# Patient Record
Sex: MALE | Race: WHITE | NOT HISPANIC OR LATINO | Employment: FULL TIME | URBAN - METROPOLITAN AREA
[De-identification: names, ages, dates, MRNs, and addresses within clinical notes are randomized per-mention and may not be internally consistent; named-entity substitution may affect disease eponyms.]

---

## 2022-07-01 ENCOUNTER — APPOINTMENT (OUTPATIENT)
Dept: LAB | Facility: CLINIC | Age: 32
End: 2022-07-01

## 2022-07-01 ENCOUNTER — OCCMED (OUTPATIENT)
Dept: URGENT CARE | Facility: CLINIC | Age: 32
End: 2022-07-01

## 2022-07-01 DIAGNOSIS — Z02.1 PRE-EMPLOYMENT HEALTH SCREENING EXAMINATION: ICD-10-CM

## 2022-07-01 DIAGNOSIS — Z02.1 PRE-EMPLOYMENT HEALTH SCREENING EXAMINATION: Primary | ICD-10-CM

## 2022-07-01 LAB — RUBV IGG SERPL IA-ACNC: 95.4 IU/ML

## 2022-07-01 PROCEDURE — 36415 COLL VENOUS BLD VENIPUNCTURE: CPT

## 2022-07-01 PROCEDURE — 86735 MUMPS ANTIBODY: CPT

## 2022-07-01 PROCEDURE — 86762 RUBELLA ANTIBODY: CPT

## 2022-07-01 PROCEDURE — 86787 VARICELLA-ZOSTER ANTIBODY: CPT

## 2022-07-01 PROCEDURE — 86765 RUBEOLA ANTIBODY: CPT

## 2022-07-01 PROCEDURE — 86480 TB TEST CELL IMMUN MEASURE: CPT

## 2022-07-01 NOTE — PROGRESS NOTES
This encounter was created for [facility] orders only   [address]        NAME: Josh Navarro is a 32 y o  male  : 1990    MRN: [id number]  DATE: 2022  TIME: 5:23 PM    There were no vitals taken for this visit  Assessment and Plan   Pre-employment health screening examination [Z02 1]  1  Pre-employment health screening examination  Quantiferon TB Gold Plus    Quantiferon TB Gold Plus    Varicella zoster antibody, IgG    Mumps antibody, IgG    Rubella antibody, IgG    Rubeola antibody IgG         Patient Instructions       Follow up with PCP in 3-5 days  Proceed to  ER if symptoms worsen  Chief Complaint   No chief complaint on file  History of Present Illness       HPI    Review of Systems   Review of Systems      Current Medications     No current outpatient medications on file  Current Allergies     Allergies as of 2022    (Not on File)            The following portions of the patient's history were reviewed and updated as appropriate: allergies, current medications, past family history, past medical history, past social history, past surgical history and problem list      No past medical history on file  No past surgical history on file  No family history on file  Medications have been verified  Objective   There were no vitals taken for this visit         Physical Exam     Physical Exam

## 2022-07-02 LAB
MEV IGG SER QL IA: NORMAL
MUV IGG SER QL IA: NORMAL
VZV IGG SER QL IA: ABNORMAL

## 2022-07-04 LAB
GAMMA INTERFERON BACKGROUND BLD IA-ACNC: 0.05 IU/ML
M TB IFN-G BLD-IMP: NEGATIVE
M TB IFN-G CD4+ BCKGRND COR BLD-ACNC: -0.03 IU/ML
M TB IFN-G CD4+ BCKGRND COR BLD-ACNC: -0.03 IU/ML
MITOGEN IGNF BCKGRD COR BLD-ACNC: >10 IU/ML

## 2025-03-27 SDOH — ECONOMIC STABILITY: FOOD INSECURITY: WITHIN THE PAST 12 MONTHS, THE FOOD YOU BOUGHT JUST DIDN'T LAST AND YOU DIDN'T HAVE MONEY TO GET MORE.: NEVER TRUE

## 2025-03-27 SDOH — ECONOMIC STABILITY: INCOME INSECURITY: IN THE LAST 12 MONTHS, WAS THERE A TIME WHEN YOU WERE NOT ABLE TO PAY THE MORTGAGE OR RENT ON TIME?: NO

## 2025-03-27 SDOH — ECONOMIC STABILITY: TRANSPORTATION INSECURITY
IN THE PAST 12 MONTHS, HAS LACK OF TRANSPORTATION KEPT YOU FROM MEETINGS, WORK, OR FROM GETTING THINGS NEEDED FOR DAILY LIVING?: NO

## 2025-03-27 SDOH — ECONOMIC STABILITY: FOOD INSECURITY: WITHIN THE PAST 12 MONTHS, YOU WORRIED THAT YOUR FOOD WOULD RUN OUT BEFORE YOU GOT MONEY TO BUY MORE.: NEVER TRUE

## 2025-03-27 SDOH — ECONOMIC STABILITY: TRANSPORTATION INSECURITY
IN THE PAST 12 MONTHS, HAS THE LACK OF TRANSPORTATION KEPT YOU FROM MEDICAL APPOINTMENTS OR FROM GETTING MEDICATIONS?: NO

## 2025-03-27 ASSESSMENT — SOCIAL DETERMINANTS OF HEALTH (SDOH): IN THE PAST 12 MONTHS, HAS THE ELECTRIC, GAS, OIL, OR WATER COMPANY THREATENED TO SHUT OFF SERVICE IN YOUR HOME?: NO

## 2025-04-02 PROBLEM — L70.9 ACNE: Status: ACTIVE | Noted: 2025-04-02

## 2025-04-02 PROBLEM — E10.9 TYPE 1 DIABETES MELLITUS (CMS/HCC): Status: ACTIVE | Noted: 2025-04-02

## 2025-04-02 RX ORDER — INSULIN ASPART 100 [IU]/ML
INJECTION, SOLUTION INTRAVENOUS; SUBCUTANEOUS
COMMUNITY

## 2025-04-02 NOTE — PROGRESS NOTES
{(Request for Consent - I am using a technology product that will make an audio recording of this office visit today. This will assist me in documenting the visit in the electronic medical record and will allow me to spend more time focused on you. Do I have the permission of everyone present in the room to record our conversation?):[id number]}    Consent obtained from patient and all parties present in the room? {VIANEY ML KSENIA YES/NO PROVIDER:[id number]}    I have obtained the consent of everyone present in the room to make an audio recording of this visit to assist me in documenting the encounter in the EMR.     Subjective     Patient ID: Darryl Ramirez, : 1990 is a 34 y.o. male who presents for No chief complaint on file.    History of Present Illness      The following have been reviewed and updated as appropriate in this visit:          Objective   There were no vitals filed for this visit.  There is no height or weight on file to calculate BMI.    Physical Exam      Results         Assessment & Plan

## 2025-04-03 ENCOUNTER — OFFICE VISIT (OUTPATIENT)
Dept: FAMILY MEDICINE | Facility: CLINIC | Age: 35
End: 2025-04-03
Payer: COMMERCIAL

## 2025-04-03 VITALS
DIASTOLIC BLOOD PRESSURE: 80 MMHG | TEMPERATURE: 98.2 F | WEIGHT: 167 LBS | HEART RATE: 64 BPM | OXYGEN SATURATION: 99 % | BODY MASS INDEX: 23.91 KG/M2 | SYSTOLIC BLOOD PRESSURE: 128 MMHG | HEIGHT: 70 IN

## 2025-04-03 DIAGNOSIS — Z23 NEED FOR TDAP VACCINATION: ICD-10-CM

## 2025-04-03 DIAGNOSIS — E10.9 TYPE 1 DIABETES MELLITUS WITHOUT COMPLICATION (CMS/HCC): ICD-10-CM

## 2025-04-03 LAB — AMB MLHC EXTERNAL DIABETIC EYE EXAMS: NEGATIVE

## 2025-04-03 PROCEDURE — 90471 IMMUNIZATION ADMIN: CPT | Performed by: FAMILY MEDICINE

## 2025-04-03 PROCEDURE — 90677 PCV20 VACCINE IM: CPT | Performed by: FAMILY MEDICINE

## 2025-04-03 PROCEDURE — 99385 PREV VISIT NEW AGE 18-39: CPT | Mod: 25 | Performed by: FAMILY MEDICINE

## 2025-04-03 PROCEDURE — 3008F BODY MASS INDEX DOCD: CPT | Performed by: FAMILY MEDICINE

## 2025-04-03 RX ORDER — BLOOD-GLUCOSE SENSOR
EACH MISCELLANEOUS
COMMUNITY
Start: 2025-01-05

## 2025-04-03 RX ORDER — BLOOD-GLUCOSE METER
EACH MISCELLANEOUS
COMMUNITY
Start: 2025-03-19

## 2025-04-03 ASSESSMENT — ENCOUNTER SYMPTOMS
FEVER: 0
SHORTNESS OF BREATH: 0
CHILLS: 0

## 2025-04-03 ASSESSMENT — PATIENT HEALTH QUESTIONNAIRE - PHQ9: SUM OF ALL RESPONSES TO PHQ9 QUESTIONS 1 & 2: 0

## 2025-04-03 NOTE — Clinical Note
Please get lab results from [facility].  Please get eye exam record from [facility].

## 2025-04-03 NOTE — PROGRESS NOTES
Consent obtained from patient and all parties present in the room? yes    I have obtained the consent of everyone present in the room to make an audio recording of this visit to assist me in documenting the encounter in the EMR.     Subjective     Patient ID: Darryl Ramirez, : 1990 is a 34 y.o. male who presents for Establish Care    History of Present Illness  The patient presents to establish care.    Type 1 Diabetes  - Managed with an insulin pump and NovoLog  - Monitored via Dexcom G7  - Under the care of Dr. Epstein at [facility]  - Biannual visits and routine lab tests  - A1c levels consistently in the low 7s for the past 3 years  - One instance of a spike to 8, attributed to a lab error  - Actively working towards reducing A1c to below 7    Cholesterol Levels  - Borderline high during last lab test  - Believes high cholesterol was due to lifestyle factors following the birth of his second son  - Made lifestyle modifications  - Anticipates a recheck of cholesterol levels at next appointment in the summer    Eye Exams  - Regular eye exams  - Most recent exam conducted a few months ago    Podiatrist Visits  - Annual visits  - No reported complications    Urine Creatinine and Microalbumin Ratio  - Monitored by endocrinologist  - Within normal limits    Vaccinations  - Has not received the pneumonia vaccine  - Received vaccines for Hep B, MMR, Hep A, DTaP, varicella, flu, and IPV  - Most recent Tdap vaccine administered in 2018    Acne  - Occasionally uses a topical medication containing tretinoin, benzoyl peroxide, and clindamycin  - Receives care from [facility]    Genital Herpes  - Had one or two flares in college after contact with someone with an oral lesion  - No issues since then    Current Health Concerns  - Reports no current health concerns  - Seeking to establish care with a primary care physician  - Previously consulted with Dr. Gray and Dr. Leonardo for routine health  matters  - Has not had a consistent primary care doctor since pediatrician    Lifestyle and Diet  -  and monogamous, no reported sexual health issues  - Balanced diet: eggs and a banana for breakfast, protein bar for mid-morning snack, salad for lunch, protein and vegetable dish for dinner  - Consumes significant amount of fruit and red meat once a week  - Engages in physical activity three times a week on [employer] bike  - Does not currently participate in strength training  - Reports good sleep quality, averaging eight hours per night  - Reports snoring  - Reports no pain    Supplemental information: He is actively working towards reducing his A1c to below 7. He anticipates a recheck of his cholesterol levels at his next appointment in the summer. He undergoes regular eye exams, with the most recent one conducted a few months ago, and annual podiatrist visits, with no reported complications. His endocrinologist also monitors his urine creatinine and microalbumin ratio, which have been within normal limits. He has not received the pneumonia vaccine. He has received vaccines for Hep B, MMR, Hep A, DTaP, varicella, flu, and IPV. His most recent Tdap vaccine was administered in December 2018.    SOCIAL HISTORY  - Does not smoke or chew tobacco  - Rarely drinks alcohol, averaging less than one drink per month  - Does not use any other drugs  -  and monogamous  - Works as a psychiatrist for [employer]    FAMILY HISTORY  - Mother has high cholesterol  - Father diagnosed with atrial fibrillation in his 60s  - One brother and one sister who are healthy    MEDICATIONS  - Current medications:    - NovoLog    - Tretinoin    - Benzoyl peroxide    - Clindamycin    IMMUNIZATIONS  - Vaccines received:    - Hep B    - MMR    - Hep A    - DTaP    - Varicella    - Flu    - IPV  - Most recent Tdap vaccine administered in December 2018    The following have been reviewed and updated as appropriate in  "this visit:   Tobacco  Med Hx  Surg Hx  Fam Hx  Soc Hx        Review of Systems   Constitutional:  Negative for chills and fever.   Respiratory:  Negative for shortness of breath.    Cardiovascular:  Negative for chest pain.   All other systems reviewed and are negative.        Objective   Vitals:    04/03/25 0834   BP: 128/80   Patient Position: Sitting   Pulse: 64   Temp: 36.8 °C (98.2 °F)   TempSrc: Temporal   SpO2: 99%   Weight: 75.8 kg (167 lb)   Height: 1.778 m (5' 10\")     Body mass index is 23.96 kg/m².    Physical Exam    Physical Exam  Vitals reviewed.   Constitutional:       Appearance: Normal appearance. He is normal weight.   HENT:      Head: Normocephalic and atraumatic.      Right Ear: External ear normal.      Left Ear: External ear normal.      Nose: Nose normal.      Mouth/Throat:      Mouth: Mucous membranes are moist.      Pharynx: Oropharynx is clear.   Eyes:      Extraocular Movements: Extraocular movements intact.      Conjunctiva/sclera: Conjunctivae normal.      Pupils: Pupils are equal, round, and reactive to light.   Cardiovascular:      Rate and Rhythm: Normal rate and regular rhythm.      Heart sounds: Normal heart sounds.   Pulmonary:      Effort: Pulmonary effort is normal.      Breath sounds: Normal breath sounds.   Abdominal:      General: Abdomen is flat. There is no distension.      Palpations: Abdomen is soft. There is no mass.      Tenderness: There is no abdominal tenderness. There is no guarding.   Musculoskeletal:         General: No deformity. Normal range of motion.      Cervical back: Normal range of motion.   Skin:     General: Skin is warm and dry.   Neurological:      General: No focal deficit present.      Mental Status: He is alert and oriented to person, place, and time.   Psychiatric:         Mood and Affect: Mood normal.         Behavior: Behavior normal.         Results  - Laboratory Studies:    - A1c has been between 7.0-7.4 for the past 3 years     "   Assessment & Plan  0. Health Maintenance  - Advised to upload old vaccine records for review  - Pneumonia vaccine to be administered today  - Engage in strength training exercises three times a week for glucose regulation and overall health  - Emphasized importance of maintaining muscle mass for glucose regulation    1. Type 1 Diabetes Mellitus  - Uses insulin pump with NovoLog and Dexcom G7 for glucose monitoring  - Continue current diabetes management plan per endocrine  - Aim to get A1c below 7         Assessment & Plan  Type 1 diabetes mellitus without complication (CMS/HCC)  Stable, continue to follow with endocrine. Will get records.   Need for Tdap vaccination

## 2025-04-24 LAB
ALP SERPL-CCNC: 63 U/L
ALT SERPL-CCNC: 16 U/L
AST SERPL-CCNC: 26 U/L
BILIRUB SERPL-MCNC: 0.8 MG/DL
BUN SERPL-MCNC: 17 MG/DL
CHLORIDE SERPL-SCNC: 100 MMOL/L
CO2 SERPL-SCNC: 24 MEQ/L
CREAT SERPL-MCNC: 0.83 MG/DL
EXTERNAL ALBUMIN (SPEP): 4.6 G/DL
EXTERNAL CREATININE URINE HM: 43.3
EXTERNAL MICROALBUMIN URINE RANDOM HM: 3.1
EXTERNAL MICROALBUMIN/CREATININE URINE: 7
GFR SERPL CREATININE-BSD FRML MDRD: 119 ML/MIN/1.73M*2
GLUCOSE SERPL-MCNC: 124 MG/DL
HBA1C MFR BLD: 8 %
POTASSIUM SERPL-SCNC: 5.2 MMOL/L
PROT SERPL-MCNC: 7 G/DL
SODIUM SERPL-SCNC: 139 MMOL/L